# Patient Record
Sex: FEMALE | Race: WHITE | URBAN - METROPOLITAN AREA
[De-identification: names, ages, dates, MRNs, and addresses within clinical notes are randomized per-mention and may not be internally consistent; named-entity substitution may affect disease eponyms.]

---

## 2023-12-04 ENCOUNTER — OFFICE VISIT (OUTPATIENT)
Dept: PRIMARY CARE CLINIC | Age: 53
End: 2023-12-04
Payer: COMMERCIAL

## 2023-12-04 VITALS
HEIGHT: 69 IN | SYSTOLIC BLOOD PRESSURE: 126 MMHG | DIASTOLIC BLOOD PRESSURE: 78 MMHG | OXYGEN SATURATION: 98 % | WEIGHT: 207.6 LBS | HEART RATE: 80 BPM | BODY MASS INDEX: 30.75 KG/M2

## 2023-12-04 DIAGNOSIS — Z13.220 LIPID SCREENING: ICD-10-CM

## 2023-12-04 DIAGNOSIS — Z76.89 ENCOUNTER TO ESTABLISH CARE WITH NEW DOCTOR: Primary | ICD-10-CM

## 2023-12-04 DIAGNOSIS — M53.3 SACROILIAC JOINT PAIN: ICD-10-CM

## 2023-12-04 DIAGNOSIS — F17.210 TOBACCO DEPENDENCE DUE TO CIGARETTES: ICD-10-CM

## 2023-12-04 PROCEDURE — 99204 OFFICE O/P NEW MOD 45 MIN: CPT | Performed by: STUDENT IN AN ORGANIZED HEALTH CARE EDUCATION/TRAINING PROGRAM

## 2023-12-04 RX ORDER — TIZANIDINE 2 MG/1
2 TABLET ORAL EVERY 8 HOURS PRN
Qty: 30 TABLET | Refills: 0 | Status: SHIPPED | OUTPATIENT
Start: 2023-12-04

## 2023-12-04 RX ORDER — IBUPROFEN 600 MG/1
600 TABLET ORAL EVERY 6 HOURS PRN
COMMUNITY

## 2023-12-04 ASSESSMENT — PATIENT HEALTH QUESTIONNAIRE - PHQ9
SUM OF ALL RESPONSES TO PHQ QUESTIONS 1-9: 0
SUM OF ALL RESPONSES TO PHQ9 QUESTIONS 1 & 2: 0
SUM OF ALL RESPONSES TO PHQ QUESTIONS 1-9: 0
1. LITTLE INTEREST OR PLEASURE IN DOING THINGS: 0
2. FEELING DOWN, DEPRESSED OR HOPELESS: 0

## 2023-12-04 ASSESSMENT — ENCOUNTER SYMPTOMS
BLOOD IN STOOL: 0
ABDOMINAL PAIN: 0
DIARRHEA: 0
NAUSEA: 0
SHORTNESS OF BREATH: 0
BACK PAIN: 1
VOMITING: 0

## 2023-12-04 NOTE — PROGRESS NOTES
Standing Status:   Future     Standing Expiration Date:   12/4/2024    CBC with Auto Differential     Standing Status:   Future     Standing Expiration Date:   12/4/2024    Lipid Panel     Standing Status:   Future     Standing Expiration Date:   12/4/2024    Comprehensive Metabolic Panel     Standing Status:   Future     Standing Expiration Date:   12/4/2024    [facility] Physical Therapy [facility]     Referral Priority:   Routine     Referral Type:   Eval and Treat     Referral Reason:   Specialty Services Required     Requested Specialty:   Physical Therapist     Number of Visits Requested:   1     Orders Placed This Encounter   Medications    tiZANidine (ZANAFLEX) 2 MG tablet     Sig: Take 1 tablet by mouth every 8 hours as needed (Low back pain)     Dispense:  30 tablet     Refill:  0       Discussed use, benefit, and side effects of prescribed medications. All patient questions answered. Pt voiced understanding. Reviewed health maintenance. Patient agreed with treatment plan. Follow up as directed.      Electronically signed by Jim Guillaume DO on 12/4/2023 at 5:03 PM

## 2023-12-05 ENCOUNTER — HOSPITAL ENCOUNTER (OUTPATIENT)
Age: 53
Discharge: HOME | End: 2023-12-05
Payer: COMMERCIAL

## 2023-12-05 ENCOUNTER — HOSPITAL ENCOUNTER (OUTPATIENT)
Age: 53
Discharge: HOME | End: 2023-12-07
Payer: COMMERCIAL

## 2023-12-05 ENCOUNTER — HOSPITAL ENCOUNTER (OUTPATIENT)
Dept: GENERAL RADIOLOGY | Age: 53
Discharge: HOME | End: 2023-12-07
Payer: COMMERCIAL

## 2023-12-05 DIAGNOSIS — Z76.89 ENCOUNTER TO ESTABLISH CARE WITH NEW DOCTOR: ICD-10-CM

## 2023-12-05 DIAGNOSIS — Z13.220 LIPID SCREENING: ICD-10-CM

## 2023-12-05 DIAGNOSIS — M53.3 SACROILIAC JOINT PAIN: ICD-10-CM

## 2023-12-05 LAB
ALBUMIN SERPL-MCNC: 4.2 G/DL (ref 3.5–5.2)
ALP SERPL-CCNC: 124 U/L (ref 35–104)
ALT SERPL-CCNC: 10 U/L (ref 5–33)
ANION GAP SERPL CALCULATED.3IONS-SCNC: 10 MMOL/L (ref 9–17)
AST SERPL-CCNC: 13 U/L
BASOPHILS # BLD: 0.1 K/UL (ref 0–0.2)
BASOPHILS NFR BLD: 1 % (ref 0–2)
BILIRUB SERPL-MCNC: 0.4 MG/DL (ref 0.3–1.2)
BUN SERPL-MCNC: 17 MG/DL (ref 6–20)
CALCIUM SERPL-MCNC: 10.1 MG/DL (ref 8.6–10.4)
CHLORIDE SERPL-SCNC: 103 MMOL/L (ref 98–107)
CHOLEST SERPL-MCNC: 175 MG/DL
CHOLESTEROL/HDL RATIO: 4.2
CO2 SERPL-SCNC: 27 MMOL/L (ref 20–31)
CREAT SERPL-MCNC: 0.8 MG/DL (ref 0.5–0.9)
EOSINOPHIL # BLD: 0.2 K/UL (ref 0–0.4)
EOSINOPHILS RELATIVE PERCENT: 3 % (ref 0–4)
ERYTHROCYTE [DISTWIDTH] IN BLOOD BY AUTOMATED COUNT: 14.1 % (ref 11.5–14.9)
GFR SERPL CREATININE-BSD FRML MDRD: >60 ML/MIN/1.73M2
GLUCOSE SERPL-MCNC: 94 MG/DL (ref 70–99)
HCT VFR BLD AUTO: 39.5 % (ref 36–46)
HDLC SERPL-MCNC: 42 MG/DL
HGB BLD-MCNC: 13.2 G/DL (ref 12–16)
LDLC SERPL CALC-MCNC: 114 MG/DL (ref 0–130)
LYMPHOCYTES NFR BLD: 2.8 K/UL (ref 1–4.8)
LYMPHOCYTES RELATIVE PERCENT: 39 % (ref 24–44)
MCH RBC QN AUTO: 33.5 PG (ref 26–34)
MCHC RBC AUTO-ENTMCNC: 33.4 G/DL (ref 31–37)
MCV RBC AUTO: 100.2 FL (ref 80–100)
MONOCYTES NFR BLD: 0.5 K/UL (ref 0.1–1.3)
MONOCYTES NFR BLD: 7 % (ref 1–7)
NEUTROPHILS NFR BLD: 50 % (ref 36–66)
NEUTS SEG NFR BLD: 3.7 K/UL (ref 1.3–9.1)
PLATELET # BLD AUTO: 389 K/UL (ref 150–450)
PMV BLD AUTO: 8.5 FL (ref 6–12)
POTASSIUM SERPL-SCNC: 4.1 MMOL/L (ref 3.7–5.3)
PROT SERPL-MCNC: 8.3 G/DL (ref 6.4–8.3)
RBC # BLD AUTO: 3.94 M/UL (ref 4–5.2)
SODIUM SERPL-SCNC: 140 MMOL/L (ref 135–144)
TRIGL SERPL-MCNC: 97 MG/DL
TSH SERPL DL<=0.05 MIU/L-ACNC: 1.59 UIU/ML (ref 0.3–5)
WBC OTHER # BLD: 7.4 K/UL (ref 3.5–11)

## 2023-12-05 PROCEDURE — 85025 COMPLETE CBC W/AUTO DIFF WBC: CPT

## 2023-12-05 PROCEDURE — 84443 ASSAY THYROID STIM HORMONE: CPT

## 2023-12-05 PROCEDURE — 36415 COLL VENOUS BLD VENIPUNCTURE: CPT

## 2023-12-05 PROCEDURE — 80053 COMPREHEN METABOLIC PANEL: CPT

## 2023-12-05 PROCEDURE — 72202 X-RAY EXAM SI JOINTS 3/> VWS: CPT

## 2023-12-05 PROCEDURE — 80061 LIPID PANEL: CPT

## 2023-12-11 ENCOUNTER — TELEPHONE (OUTPATIENT)
Dept: PRIMARY CARE CLINIC | Age: 53
End: 2023-12-11

## 2023-12-12 NOTE — TELEPHONE ENCOUNTER
Received forms and called patient asking what she is needing FMLA for. Patient said she is unable to work and needs time off. She said she has been off work since 12/4/23 and will return once you clear her to work. Patient advised I would have to get the okay from  before I fill out any forms.     Please advise

## 2023-12-13 ENCOUNTER — HOSPITAL ENCOUNTER (OUTPATIENT)
Dept: PHYSICAL THERAPY | Age: 53
Setting detail: THERAPIES SERIES
Discharge: HOME | End: 2023-12-13
Payer: COMMERCIAL

## 2023-12-13 PROCEDURE — 97530 THERAPEUTIC ACTIVITIES: CPT

## 2023-12-13 PROCEDURE — 97161 PT EVAL LOW COMPLEX 20 MIN: CPT

## 2023-12-13 NOTE — THERAPY EVALUATION
term HEP for continued progress/maintenance after completion of PT      Functional Assessment Used: Mod. HAL  Current Status Score: 19/50 = 38%   Goal Status Score: <15%     Evaluation Complexity:  History (Personal factors, comorbidities) [x] 0 [] 1-2 [] 3+   Exam (limitations, restrictions) [] 1-2 [x] 3 [] 4+   Clinical presentation (progression) [x] Stable [] Evolving  [] Unstable   Decision Making [x] Low [] Moderate [] High    [x] Low Complexity [] Moderate Complexity [] High Complexity     Rehab Potential:  [x] Good  [] Fair  [] Poor   Suggested Professional Referral:  [x] No  [] Yes:  Barriers to Goal Achievement[de-identified]  [x] No  [] Yes:  Domestic Concerns:  [x] No  [] Yes:    Pt. Education:  [x] Plans/Goals, Risks/Benefits discussed  [x] Home exercise program  Method of Education: [x] Verbal  [x] Demo  [x] Written  Comprehension of Education:  [x] Verbalizes understanding. [x] Demonstrates understanding. [] Needs Review. [] Demonstrates/verbalizes understanding of HEP/Ed previously given. Treatment Plan:  [x] Therapeutic Exercise   61711  [] Iontophoresis: 4 mg/mL Dexamethasone Sodium Phosphate  mAmin  43005   [x] Therapeutic Activity  60859 [] Vasopneumatic cold with compression  07345    [] Gait Training   88095 [] Ultrasound   10903   [x] Neuromuscular Re-education  60503 [] Electrical Stimulation Unattended  23740   [x] Manual Therapy  93826 [] Electrical Stimulation Attended  46971   [x] Instruction in HEP  [] Lumbar/Cervical Traction  96584   [x] Aquatic Therapy   01351 [] Cold/hotpack    [] Massage   74994      [] Dry Needling, 1 or 2 muscles  55416   [] Biofeedback, first 15 minutes   72329  [] Biofeedback, additional 15 minutes   44666 [] Dry Needling, 3 or more muscles  57908     []  Medication allergies reviewed for use of    Dexamethasone Sodium Phosphate 4mg/ml     with iontophoresis treatments. Pt is not allergic.        Frequency: 2 x/week for 12 visits    Tip Jackson

## 2023-12-15 ENCOUNTER — HOSPITAL ENCOUNTER (OUTPATIENT)
Dept: PHYSICAL THERAPY | Age: 53
Setting detail: THERAPIES SERIES
Discharge: HOME | End: 2023-12-15
Payer: COMMERCIAL

## 2023-12-15 PROCEDURE — 97113 AQUATIC THERAPY/EXERCISES: CPT

## 2023-12-15 NOTE — FLOWSHEET NOTE
[x] [facility]) - [facility] & Therapy  [address]  [place]: [phone]   F: [phone]    Physical Therapy Daily Treatment Note    Date:  12/15/2023  Patient Name:  Geno Rose    :  1970  MRN: [id number]  Physician:      Eliecer Guillory DO                          Insurance: [facility] Diagnosis: M53.3 (ICD-10-CM) - Sacroiliac joint pain               Rehab Codes: R25 pain , M25.60 stiffness, R53.1 weakness   Onset Date: 2023                       Next 's appt: 24 - PCP   Visit# / total visits:   Cancels/No Shows: 0/0    Subjective:  Pt reports pain in left lumbar back/SI joint area this morning with radicular pain down into left buttocks. States pain usually gets worse with increased activity. Denies any issues after evaluation. Pain:  [x] Yes  [] No Location: Left lumbar back along SI joint   Pain Rating: (0-10 scale)  4/10  Pain altered Tx:  [x] No  [] Yes  Action:  Comments: Initial aquatic therapy visit. Educated on postural awareness, core stability and working in pain free ranges with all exercises performed. Objective:    [facility] [employer] Exercise Log  Aquatic, Hip & DLS Program- Phase 1    Date of Eval:   23                             Primary PT: Irasema Rojas, PT      Date 12/15/23       Visit # 2/12       Walk F/L/R 2 Laps       Marching 10x       Squats 10x3\"       Step-Ups F/L        Step Down F/L        Heel-toe raises 10x       SLR F/L/R 10x       Hip/Knee Flex/Ext        F/L Lunges                Kickboard Ex.  Small       Iso Abd. 10x5\"       Push-pull 10x       Paddling                UE Format:        Horiz Abd/Add        IR/ER (wipers)        Alt Flex/Ext        Alt Press Down        Abd/Add                Deep Water: 1 Noodle       Hang 3'       Cycling        [place]        X-Country                Balance        SLS                Stretches        Achllies

## 2023-12-27 ENCOUNTER — HOSPITAL ENCOUNTER (OUTPATIENT)
Dept: PHYSICAL THERAPY | Age: 53
Setting detail: THERAPIES SERIES
Discharge: HOME | End: 2023-12-27
Payer: COMMERCIAL

## 2023-12-27 PROCEDURE — 97113 AQUATIC THERAPY/EXERCISES: CPT

## 2023-12-27 NOTE — FLOWSHEET NOTE
[x] [address] [facility] & Therapy  [address]  [place]: [phone]   F: [phone]    Physical Therapy Daily Treatment Note    Date:  2023  Patient Name:  Roque Ramirez    :  1970  MRN: [id number]  Physician:      Bill Morgan DO                          Insurance: [employer] P[employer] blue   Medical Diagnosis: M53.3 (ICD-10-CM) - Sacroiliac joint pain               Rehab Codes: R25 pain , M25.60 stiffness, R53.1 weakness   Onset Date: 2023                       Next 's appt: 24 - PCP   Visit# / total visits:   Cancels/No Shows: 0/0    Subjective:  Pt reports no issues after last visit. States getting out of bed in the morning is getting easier. Also notes some ADLs are easier while other still cause increased pain. Notes pain still gets very high when she over-does-it. Pain:  [x] Yes  [] No Location: Left lumbar back along SI joint   Pain Rating: (0-10 scale)  2-3/10  Pain altered Tx:  [x] No  [] Yes  Action:  Comments: reviewed postural awareness, core stability and working in pain free ranges with all exercises performed. Objective:    [address] [place] Exercise Log  Aquatic, Hip & DLS Program- Phase 1    Date of Eval:   23                             Primary PT: Jossie Sebastian, PT      Date 12/15/23 12/18/23 12/20/23 12/27/23    Visit # 2/12 3/12 4/12 5/12    Walk F/L/R 2 Laps 2 Laps +R 2 Laps 2 Laps    Marching 10x 10x 10x 2 Laps     Squats 10x3\" 10x3\" 10x3\" 10x3\"    Step-Ups F/L   Low 10x Low 10x    Step Down F/L        Heel-toe raises 10x 10x 12x 12x    SLR F/L/R 10x 10x 10x 12x    Hip/Knee Flex/Ext  10x 10x 12x    F/L Lunges                Kickboard Ex.  Small Small Med Med    Iso Abd. 10x5\" 10x5\" 10x5' 10x5\"    Push-pull 10x 10x 12x 12x    Paddling    12x            UE Format:        Horiz Abd/Add     Add   IR/ER (wipers)        Alt Flex/Ext     Add   Alt Press Down        Abd/Add     Add

## 2023-12-29 ENCOUNTER — HOSPITAL ENCOUNTER (OUTPATIENT)
Dept: PHYSICAL THERAPY | Age: 53
Setting detail: THERAPIES SERIES
Discharge: HOME | End: 2023-12-29
Payer: COMMERCIAL

## 2023-12-29 PROCEDURE — 97113 AQUATIC THERAPY/EXERCISES: CPT

## 2023-12-29 NOTE — FLOWSHEET NOTE
[x] [facility] - [facility] & Therapy  [address]  [place]: [phone]   F: [phone]    Physical Therapy Daily Treatment Note    Date:  2023  Patient Name:  Ginny Gomes    :  1970  MRN: [id number]  Physician:      Olga Chinchilla DO                          Insurance: [employer]  PPO blue   Medical Diagnosis: M53.3 (ICD-10-CM) - Sacroiliac joint pain               Rehab Codes: R25 pain , M25.60 stiffness, R53.1 weakness   Onset Date: 2023                       Next 's appt: 24 - PCP   Visit# / total visits:   Cancels/No Shows: 0/0    Subjective:  Pt reports mild soreness after last visit but no increased pain. Notes pain has been better but still will increase with certain motions/positions. For prolonged periods of time. Pain:  [x] Yes  [] No Location: Left lumbar back along SI joint   Pain Rating: (0-10 scale)  1/10  Pain altered Tx:  [x] No  [] Yes  Action:  Comments: reviewed postural awareness, core stability and working in pain free ranges with all exercises performed. Objective:    [address] [place] Exercise Log  Aquatic, Hip & DLS Program- Phase 1    Date of Eval:   23                             Primary PT: Pavel Gaming, PT      Date 12/15/23 12/18/23 12/20/23 12/27/23 12/29/23   Visit # 2/12 3/12 4/12 5/12 6/12   Walk F/L/R 2 Laps 2 Laps +R 2 Laps 2 Laps 2 Laps   Marching 10x 10x 10x 2 Laps  2 Laps   Squats 10x3\" 10x3\" 10x3\" 10x3\" 12x5\"   Step-Ups F/L   Low 10x Low 10x Low 12x   Step Down F/L        Heel-toe raises 10x 10x 12x 12x 12x   SLR F/L/R 10x 10x 10x 12x 12x   Hip/Knee Flex/Ext  10x 10x 12x 12x   F/L Lunges                Kickboard Ex.  Small Small Med Med Lg   Iso Abd. 10x5\" 10x5\" 10x5' 10x5\" 12x5\"   Push-pull 10x 10x 12x 12x 12x   Paddling    12x 12x           UE Format:        Horiz Abd/Add     10x   IR/ER (wipers)        Alt Flex/Ext     10x   Alt Press Down        Abd/Add

## 2024-01-04 ENCOUNTER — HOSPITAL ENCOUNTER (OUTPATIENT)
Dept: PHYSICAL THERAPY | Age: 54
Setting detail: THERAPIES SERIES
Discharge: HOME | End: 2024-01-04
Payer: COMMERCIAL

## 2024-01-04 PROCEDURE — 97113 AQUATIC THERAPY/EXERCISES: CPT

## 2024-01-04 NOTE — FLOWSHEET NOTE
[x] [facility]   Outpatient Rehabilitation & Therapy  [address]  P: [phone]   F: [phone]    Physical Therapy Daily Treatment Note    Date:  2024  Patient Name:  Yoly Peck    :  1970  MRN: [id number]  Physician:      Anthony Pascal DO                          Insurance: [employer]   Medical Diagnosis: M53.3 (ICD-10-CM) - Sacroiliac joint pain               Rehab Codes: R25 pain , M25.60 stiffness, R53.1 weakness   Onset Date: 2023                       Next 's appt: 24 - PCP   Visit# / total visits:   Cancels/No Shows: 0/0    Subjective: Patient reporting she is able to get out of bed with less pain.  Patient reporting overall less pain with mobility.     Pain:  [x] Yes  [] No Location: Left lumbar back along SI joint   Pain Rating: (0-10 scale)  3/10  Pain altered Tx:  [x] No  [] Yes  Action:  Comments: reviewed postural awareness, core stability and working in pain free ranges with all exercises performed.     Objective:    [facility] Exercise Log  Aquatic, Hip & DLS Program- Phase 1    Date of Eval:   23                             Primary PT: Hernandez Mckinley, PT      Date 23   Visit #    Walk F/L/R 2 Laps 2 Laps 2 Laps   Marching 2 Laps  2 Laps 2 Laps   Squats 10x3\" 12x5\" 12x 5\"   Step-Ups F/L Low 10x Low 12x Low 12x   Step Down F/L      Heel-toe raises 12x 12x Low 12x   SLR F/L/R 12x 12x Low 12x   Hip/Knee Flex/Ext 12x 12x Low 12x   F/L Lunges            Kickboard Ex. Med Lg Lg   Iso Abd. 10x5\" 12x5\" 12x5\"   Push-pull 12x 12x 12x5\"   Paddling 12x 12x 12x         UE Format:   Water chest height   Horiz Abd/Add  10x 10x   IR/ER (wipers)   10x   Alt Flex/Ext  10x 10x   Alt Press Down   10x   Abd/Add  10x 10x         Deep Water: 1 Noodle 1 Noodle 1 Noodle   Hang 5' 5' Pt declined   Cycling 1' 2' 2'   Jacks 1' 2' 2'   X-Country 1' 2' 2'         Balance      SLS

## 2024-01-05 ENCOUNTER — HOSPITAL ENCOUNTER (OUTPATIENT)
Dept: PHYSICAL THERAPY | Age: 54
Setting detail: THERAPIES SERIES
Discharge: HOME | End: 2024-01-05
Payer: COMMERCIAL

## 2024-01-05 PROCEDURE — 97113 AQUATIC THERAPY/EXERCISES: CPT

## 2024-01-05 NOTE — FLOWSHEET NOTE
[x] [facility]   Outpatient Rehabilitation & Therapy  [address]  P: [phone]   F: [phone]    Physical Therapy Daily Treatment Note    Date:  2024  Patient Name:  Yoly Peck    :  1970  MRN: [id number]  Physician:      Anthony Pascal DO                          Insurance: [employer]BS [employer] [facility]   Medical Diagnosis: M53.3 (ICD-10-CM) - Sacroiliac joint pain               Rehab Codes: R25 pain , M25.60 stiffness, R53.1 weakness   Onset Date: 2023                       Next 's appt: 24 - PCP   Visit# / total visits:   Cancels/No Shows: 0/0    Subjective: Patient reporting she was sore after yesterday's session stating she would like to do the deep water hang to see if that helps.    Pain:  [x] Yes  [] No Location: Left lumbar back along SI joint   Pain Rating: (0-10 scale)  3/10  Pain altered Tx:  [x] No  [] Yes  Action:  Comments:    Objective:    [facility] Exercise Log  Aquatic, Hip & DLS Program- Phase 1    Date of Eval:   23                             Primary PT: Hernandez Mckinley, PT      Date 23   Visit #    Walk F/L/R 2 Laps 2 Laps 2 Laps 2 Laps   Marching 2 Laps  2 Laps 2 Laps 2   Squats 10x3\" 12x5\" 12x 5\" 12x5\"   Step-Ups F/L Low 10x Low 12x Low 12x Tall 12x    Step Down F/L       Heel-toe raises 12x 12x Low 12x Low 12x   SLR F/L/R 12x 12x Low 12x Low 12x   Hip/Knee Flex/Ext 12x 12x Low 12x Low 12x   F/L Lunges              Kickboard Ex. Med Lg Lg Lg   Iso Abd. 10x5\" 12x5\" 12x5\" 12x5\"   Push-pull 12x 12x 12x 12x   Paddling 12x 12x 12x 12x          UE Format:   Water chest height Water chest height   Horiz Abd/Add  10x 10x 10x   IR/ER (wipers)   10x 10x   Alt Flex/Ext  10x 10x 10x   Alt Press Down   10x 10x   Abd/Add  10x 10x 10x          Deep Water: 1 Noodle 1 Noodle 1 Noodle 1 noodle   Hang 5' 5' Pt declined 5'   Cycling 1' 2' 2' 2'   Jacks 1' 2' 2' 2'

## 2024-01-09 ENCOUNTER — HOSPITAL ENCOUNTER (OUTPATIENT)
Dept: PHYSICAL THERAPY | Age: 54
Setting detail: THERAPIES SERIES
Discharge: HOME | End: 2024-01-09
Payer: COMMERCIAL

## 2024-01-09 PROCEDURE — 97113 AQUATIC THERAPY/EXERCISES: CPT

## 2024-01-09 NOTE — FLOWSHEET NOTE
[x] [facility]   Outpatient Rehabilitation & Therapy  [address]  P: [phone]   F: [phone]    Physical Therapy Daily Treatment Note    Date:  2024  Patient Name:  Yoly Peck    :  1970  MRN: [id number]  Physician:      Anthony Pascal DO                          Insurance: [employer]   Medical Diagnosis: M53.3 (ICD-10-CM) - Sacroiliac joint pain               Rehab Codes: R25 pain , M25.60 stiffness, R53.1 weakness   Onset Date: 2023                       Next 's appt: 24 - PCP   Visit# / total visits:   Cancels/No Shows: 0/0    Subjective: Patient reports pain still in left SI joint area.  Notes morning are improved and able to get out of bed with less pain.  Also noting less high pain days but does still have them.  Notes less pain with ADLs and able to be more active without increasing pain since starting therapy.     Pain:  [x] Yes  [] No Location: Left lumbar back along SI joint   Pain Rating: (0-10 scale)  3/10  Pain altered Tx:  [x] No  [] Yes  Action:  Comments:    Objective:    [facility] Exercise Log  Aquatic, Hip & DLS Program- Phase 1    Date of Eval:   23                             Primary PT: Hernandez Mckinley, PT      Date 23   Visit #    Walk F/L/R 2 Laps 2 Laps 2 Laps 2 Laps 2 Laps   Marching 2 Laps  2 Laps 2 Laps 2 2 Laps         Low box for all LE exs   Squats 10x3\" 12x5\" 12x 5\" 12x5\" 12x5\"   Step-Ups F/L Low 10x Low 12x Low 12x Tall 12x  Tall 12x   Step Down F/L        Heel-toe raises 12x 12x Low 12x Low 12x 15x   SLR F/L/R 12x 12x Low 12x Low 12x 15x   Hip/Knee Flex/Ext 12x 12x Low 12x Low 12x 15x   F/L Lunges                Kickboard Ex. Med Lg Lg Lg Lg   Iso Abd. 10x5\" 12x5\" 12x5\" 12x5\" 12x5\"   Push-pull 12x 12x 12x 12x 15x   Paddling 12x 12x 12x 12x 15x           UE Format:   Water chest height Water chest height

## 2024-01-11 ENCOUNTER — TELEPHONE (OUTPATIENT)
Dept: PRIMARY CARE CLINIC | Age: 54
End: 2024-01-11

## 2024-01-11 ENCOUNTER — HOSPITAL ENCOUNTER (OUTPATIENT)
Dept: PHYSICAL THERAPY | Age: 54
Setting detail: THERAPIES SERIES
Discharge: HOME | End: 2024-01-11
Payer: COMMERCIAL

## 2024-01-11 PROCEDURE — 97113 AQUATIC THERAPY/EXERCISES: CPT

## 2024-01-11 NOTE — FLOWSHEET NOTE
[x] [facility]   Outpatient Rehabilitation & Therapy  [address]  P: [phone]   F: [phone]    Physical Therapy Daily Treatment Note    Date:  2024  Patient Name:  Yoly Peck    :  1970  MRN: [id number]  Physician:      Anthony Pascal DO                          Insurance: [employer]   Medical Diagnosis: M53.3 (ICD-10-CM) - Sacroiliac joint pain               Rehab Codes: R25 pain , M25.60 stiffness, R53.1 weakness   Onset Date: 2023                       Next 's appt: 24 - PCP   Visit# / total visits: 10/12  Cancels/No Shows: 0/0    Subjective: Patient reports increased soreness for the rest of the day after therapy but manageable.  Increased pain yesterday due to sitting in the car for a few hours.  Reports pain usually increases after about 15 minutes of sitting.      Pain:  [x] Yes  [] No Location: Left lumbar back along SI joint   Pain Rating: (0-10 scale)  1/10  Pain altered Tx:  [x] No  [] Yes  Action:  Comments:    Objective:    [facility] Exercise Log  Aquatic, Hip & DLS Program- Phase 1    Date of Eval:   23                             Primary PT: Hernandez Mckinley, PT      Date 24   Visit # 7/12 8/12 9/12 10/12   Walk F/L/R 2 Laps 2 Laps 2 Laps 2 Laps   Marching 2 Laps 2 2 Laps  2 Laps      Low box for all LE exs Low box for all LE exs   Squats 12x 5\" 12x5\" 12x5\" 15x5\"   Step-Ups F/L Low 12x Tall 12x  Tall 12x Tall 15x   Step Down F/L       Heel-toe raises Low 12x Low 12x 15x 15x   SLR F/L/R Low 12x Low 12x 15x 15x   Hip/Knee Flex/Ext Low 12x Low 12x 15x 15x   F/L Lunges              Kickboard Ex. Lg Lg Lg Lg   Iso Abd. 12x5\" 12x5\" 12x5\" 15x5\"   Push-pull 12x 12x 15x 15x   Paddling 12x 12x 15x 15x          UE Format: Water chest height Water chest height Chest deep Chest Deep   Horiz Abd/Add 10x 10x 12x 15x   IR/ER (wipers) 10x 10x 12x 15x   Alt Flex/Ext 10x 10x 12x

## 2024-01-11 NOTE — TELEPHONE ENCOUNTER
----- Message from Yari Jose Luis sent at 1/9/2024 10:26 AM EST -----  Subject: Message to Provider    QUESTIONS  Information for Provider? Patient is calling to see if she FMLA paperwork   got sent to [place]. Please call   ---------------------------------------------------------------------------  --------------  CALL BACK INFO  [phone]; OK to leave message on voicemail  ---------------------------------------------------------------------------  --------------  SCRIPT ANSWERS  Relationship to Patient? Self

## 2024-01-16 ENCOUNTER — HOSPITAL ENCOUNTER (OUTPATIENT)
Dept: PHYSICAL THERAPY | Age: 54
Setting detail: THERAPIES SERIES
Discharge: HOME | End: 2024-01-16
Payer: COMMERCIAL

## 2024-01-16 PROCEDURE — 97530 THERAPEUTIC ACTIVITIES: CPT

## 2024-01-16 PROCEDURE — 97113 AQUATIC THERAPY/EXERCISES: CPT

## 2024-01-16 NOTE — PROGRESS NOTES
with LE movement showing improved lower trunk control to stabilize spine.    1/16: MET   Pt will have a neutral pelvic alignment on standing showing improved alignment and decreased muscle tightness allowing for better movement tolerances.    1/16: MET   LTG: (to be met in 12 treatments)  Pt will demonstrate awareness of lumbar protection strategies with bed mobility, bending/reaching, and lifting to reduce strain to spine and complete ADLs with more stability, reducing dysfunction.  1/16: MET      Pt will be able to push/pull > 50# without > 2 pt increase in back pain to demonstrate improved tolerance to work activities.    1/16: progressing   Pt will decrease functional limitation per mod HAL to <15% in order to demonstrate improved functional tolerances at PLOF with minimal restriction/dysfunction   1/16: MET - 14%   Pt will demonstrate independence with a long term HEP for continued progress/maintenance after completion of PT    1/16: progressing -- will continue to update with remaining visits.   5. New gaol 1/16/24: Pt will be able to lift > 50# from the floor and from a chair to be able to complete work duties with less dysfunction.     Treatment Plan:  [x] Therapeutic Exercise   72441  [] Iontophoresis: 4 mg/mL Dexamethasone Sodium Phosphate  mAmin  06028   [x] Therapeutic Activity  92177 [] Vasopneumatic cold with compression  87808    [] Gait Training   79117 [] Ultrasound   37349   [x] Neuromuscular Re-education  63558 [] Electrical Stimulation Unattended  00978   [x] Manual Therapy  68777 [] Electrical Stimulation Attended  91302   [x] Instruction in HEP  [] Lumbar/Cervical Traction  04469   [] Aquatic Therapy   70196 [] Cold/hotpack    [] Massage   50392      [] Dry Needling, 1 or 2 muscles  20560   [] Biofeedback, first 15 minutes   90912  [] Biofeedback, additional 15 minutes   90913 [] Dry Needling, 3 or more muscles  20561      Patient Status:     [] Continue per initial plan of care.    [x]

## 2024-01-16 NOTE — FLOWSHEET NOTE
Water chest height Chest deep Chest Deep Chest Deep   Horiz Abd/Add 10x 10x 12x 15x 15x   IR/ER (wipers) 10x 10x 12x 15x 15x   Alt Flex/Ext 10x 10x 12x 15x 15x   Alt Press Down 10x 10x 12x 15x 15x   Abd/Add 10x 10x 12x 15x 15x           Deep Water: 1 Noodle 1 noodle 1 Noodle 1 Noodle 1 Noodle   Hang Pt declined 5' 5' 5' 5'   Cycling 2' 2' 2' 2' 2'   Jacks 2' 2' 2' 2' 2'   X-Country 2' 2' 2' 2' 2'           Balance        SLS                Stretches        Achllies        Hamstring 2x20\" 2x20\" 2x20\" 2x20\" 2x20\"           Cool Down   2 Laps Breast Stroke 2 Laps Breast Stroke 2 Laps Breast Stroke   Pain Rating 2 depends on movement 1-2 (pt reporting pulling with certain motions) 1-2 2 2      Specific Instructions for next treatment: Add heel taps and progress UE format to Time/count to encourage increased speed for further scapular and core stability.    Assessment: [x] Progressing toward goals.  Good tolerance to increased speed and encouraged patient to use little to no UE support to further challenge trunk/core stability.  Minimal difficulty with balance and stability but more challenging per patient.  Denies any increased pain but still notes pulling and tightness in left SI joint area with lateral step ups to tall step. Ended with deep water exercises and hang for core and LE endurance and pain relief by unloading spine.      [] No change.     [] Other:    [x] Patient would continue to benefit from skilled physical therapy services in order to:  improve lumbar mobility, increase posterior chain flexibility, improve proximal strength, and work on reloading the spine in a safe manner to allow her complete ADLs and work duties with less dysfunction.     STG: (to be met in 6 treatments)  Pt will self report worst pain no greater than 3/10 low back pain upon arrival to sessions in order to better tolerate ADLs/work activities with minimal dysfunction  Pt will improve AROM in all lumbar planes to >75% with no pain

## 2024-01-18 ENCOUNTER — HOSPITAL ENCOUNTER (OUTPATIENT)
Dept: PHYSICAL THERAPY | Age: 54
Setting detail: THERAPIES SERIES
Discharge: HOME | End: 2024-01-18
Payer: COMMERCIAL

## 2024-01-18 ENCOUNTER — APPOINTMENT (OUTPATIENT)
Dept: PHYSICAL THERAPY | Age: 54
End: 2024-01-18
Payer: COMMERCIAL

## 2024-01-18 PROCEDURE — 97110 THERAPEUTIC EXERCISES: CPT

## 2024-01-18 NOTE — FLOWSHEET NOTE
[x] [facility]   Outpatient Rehabilitation & Therapy  [address]  P: [phone]   F: [phone]    Physical Therapy Daily Treatment Note      Date:  2024  Patient Name:  Yoly Peck    :  1970  MRN: [id number]  Physician:      Anthony Pascal DO                          Insurance: [employer]BS [employer] [facility]   Medical Diagnosis: M53.3 (ICD-10-CM) - Sacroiliac joint pain               Rehab Codes: R25 pain , M25.60 stiffness, R53.1 weakness   Onset Date: 2023                       Next 's appt: 24 - PCP   Visit# / total visits:                   Cancels/No Shows: 0/0    Subjective:  Patient reporting her pain is dependent on level of activity.  Patient reporting the push/pull activity from PN caused increased pain.      Pain:  [x] Yes  [] No Location: low back Pain Rating: (0-10 scale) 1/10  Pain altered Tx:  [] No  [] Yes  Action:  Comments:    Objective:  Modalities:   Precautions:  Exercises:  Exercise Reps/ Time Weight/ Level Completed  Today Comments   NuStep   x Added as warm up while gathering subjective data to improve mobility at SI          Mat       Quad stretch 3x30\"  x    Hamstring stretch 3x30\"  x    LTR 10x 5\"  x    bridging 10x 3\"  x           Standing       3 way hip 10x ea  x    Mini squats  10x  x    Step ups fwd/lat 10x  x           Other:    Specific Instructions for next treatment:      Assessment: [] Progressing toward goals.    [] No change.     [x] Other: Initial session post eval-  Began session on NuStep as warm up while gathering subjective info.  Added NuStep to simulate weight bearing into BLEs with reciprocal LE movement to prep SI region for exercise.  Added mat level stretching this date to improve lumbar and BLE mobility- pt reported cramping in quads post NuStep.  R-educated the patient on the importance of abdominal bracing to improve core and trunk stability.  Added LTR and bridging with emphasis on core engagement and to

## 2024-01-23 ENCOUNTER — HOSPITAL ENCOUNTER (OUTPATIENT)
Dept: PHYSICAL THERAPY | Age: 54
Setting detail: THERAPIES SERIES
Discharge: HOME | End: 2024-01-23
Payer: COMMERCIAL

## 2024-01-23 PROCEDURE — 97110 THERAPEUTIC EXERCISES: CPT

## 2024-01-23 NOTE — FLOWSHEET NOTE
Pt will decrease functional limitation per mod HAL to <15% in order to demonstrate improved functional tolerances at PLOF with minimal restriction/dysfunction        1/16: MET - 14%   Pt will demonstrate independence with a long term HEP for continued progress/maintenance after completion of PT         1/16: progressing -- will continue to update with remaining visits.   5. New gaol 1/16/24: Pt will be able to lift > 50# from the floor and from a chair to be able to complete work duties with less dysfunction.     Pt. Education:  [x] Yes  [] No  [] Reviewed Prior HEP/Ed  Method of Education: [x] Verbal  [x] Demo  [x] Written  Access Code: [email or web address]  URL: [email or web address]  Date: 01/18/2024  Prepared by: Carole Lozoya    Exercises  - Supine Lower Trunk Rotation  - 1 x daily - 7 x weekly - 3 sets - 10 reps  - Supine Bridge  - 1 x daily - 7 x weekly - 3 sets - 10 reps  - Supine Hamstring Stretch with Strap  - 1 x daily - 7 x weekly - 3-5 reps - 30sec hold  - Supine Quadriceps Stretch with Strap on Table  - 1 x daily - 7 x weekly - 3-5 reps - 30 sec hold  Comprehension of Education:  [x] Verbalizes understanding.  [x] Demonstrates understanding.  [] Needs review.  [] Demonstrates/verbalizes HEP/Ed previously given.     Plan: [x] Continue per plan of care.   [] Other:      Treatment Charges: Mins Units   []  Modalities     [x]  Ther Exercise 38 3   []  Manual Therapy     []  Ther Activities     []  Aquatics     []  Neuromuscular     [] Vasocompression     [] Gait Training     [] Dry needling        [] 1 or 2 muscles        [] 3 or more muscles     []  Other     Total Billable time 38 3     Time In: 0948           Time Out: 1026    Electronically signed by:  Hernandez Mckinley PT

## 2024-01-25 ENCOUNTER — HOSPITAL ENCOUNTER (OUTPATIENT)
Dept: PHYSICAL THERAPY | Age: 54
Setting detail: THERAPIES SERIES
Discharge: HOME | End: 2024-01-25
Payer: COMMERCIAL

## 2024-01-25 PROCEDURE — 97110 THERAPEUTIC EXERCISES: CPT

## 2024-01-25 NOTE — FLOWSHEET NOTE
standing showing improved alignment and decreased muscle tightness allowing for better movement tolerances.         1/16: MET   LTG: (to be met in 12 treatments)  Pt will demonstrate awareness of lumbar protection strategies with bed mobility, bending/reaching, and lifting to reduce strain to spine and complete ADLs with more stability, reducing dysfunction.  1/16: MET      Pt will be able to push/pull > 50# without > 2 pt increase in back pain to demonstrate improved tolerance to work activities.               1/16: progressing   Pt will decrease functional limitation per mod HAL to <15% in order to demonstrate improved functional tolerances at PLOF with minimal restriction/dysfunction        1/16: MET - 14%   Pt will demonstrate independence with a long term HEP for continued progress/maintenance after completion of PT         1/16: progressing -- will continue to update with remaining visits.   5. New gaol 1/16/24: Pt will be able to lift > 50# from the floor and from a chair to be able to complete work duties with less dysfunction.     Pt. Education:  [x] Yes  [] No  [] Reviewed Prior HEP/Ed  Method of Education: [x] Verbal  [x] Demo  [x] Written  Access Code: [email or web address]  URL: [email or web address]  Date: 01/18/2024  Prepared by: Carole Lozoya    Exercises  - Supine Lower Trunk Rotation  - 1 x daily - 7 x weekly - 3 sets - 10 reps  - Supine Bridge  - 1 x daily - 7 x weekly - 3 sets - 10 reps  - Supine Hamstring Stretch with Strap  - 1 x daily - 7 x weekly - 3-5 reps - 30sec hold  - Supine Quadriceps Stretch with Strap on Table  - 1 x daily - 7 x weekly - 3-5 reps - 30 sec hold  Comprehension of Education:  [x] Verbalizes understanding.  [x] Demonstrates understanding.  [] Needs review.  [] Demonstrates/verbalizes HEP/Ed previously given.     Plan: [x] Continue per plan of care.   [] Other:      Treatment Charges: Mins Units   []  Modalities     [x]  Ther Exercise 41 3   []  Manual Therapy     []  Ther

## 2024-01-30 ENCOUNTER — HOSPITAL ENCOUNTER (OUTPATIENT)
Dept: PHYSICAL THERAPY | Age: 54
Setting detail: THERAPIES SERIES
Discharge: HOME | End: 2024-01-30
Payer: COMMERCIAL

## 2024-01-30 PROCEDURE — 97140 MANUAL THERAPY 1/> REGIONS: CPT

## 2024-01-30 PROCEDURE — 97110 THERAPEUTIC EXERCISES: CPT

## 2024-01-30 NOTE — FLOWSHEET NOTE
Continue per plan of care.   [] Other:      Treatment Charges: Mins Units   []  Modalities     [x]  Ther Exercise 34 2   [x]  Manual Therapy 8 1   []  Ther Activities     []  Aquatics     []  Neuromuscular     [] Vasocompression     [] Gait Training     [] Dry needling        [] 1 or 2 muscles        [] 3 or more muscles     []  Other     Total Billable time 42 3     Time In:  0945         Time Out: 1027    Electronically signed by:  Hernandez Mckinley PT

## 2024-02-01 ENCOUNTER — HOSPITAL ENCOUNTER (OUTPATIENT)
Dept: PHYSICAL THERAPY | Age: 54
Setting detail: THERAPIES SERIES
Discharge: HOME | End: 2024-02-01
Payer: COMMERCIAL

## 2024-02-01 PROCEDURE — 97110 THERAPEUTIC EXERCISES: CPT

## 2024-02-01 PROCEDURE — 97140 MANUAL THERAPY 1/> REGIONS: CPT

## 2024-02-01 NOTE — FLOWSHEET NOTE
flexor stretch on step  3x20s ea       RDLs 10x2 15# KB   Delayed hip extension activation    KB front raise  10x2 15# KB      Palloff press  10x2 blue     Palloff walk out  5x ea Blue   More difficulty with going to R to keep trunk control    Forward & lateral lunges 10x ea       Unilateral carry  2 laps 15# KB   1 lap with each hand, cues for trunk alignment as tends to lean    Other:  2/1 Educated the patient on seated lumbar roll for inc low back support when sitting.  Pt reporting stretching sensation.      PAIN POST SESSION: 4/10     Specific Instructions for next treatment:  - progress loading and standing tolerances       Assessment: [x] Progressing toward goals. 2/1  Added PA mobs this session to SI to pt's tolerance with pressure sensitivity noted this date.  Continued with mat level stretching and strengthening with occasional cues provided to encourage core and glut activation.  Followed with standing exercises with emphasis on core engagement and improve hip stability.  Discomfort noted in pt's low back with SLS and use, especially with squats.  Educated the patient on lumbar roll to increase lumbar support when seated with the patient reporting a stretching sensation in her low back with no increase in pain.   Patient reporting 4/10 pain at the end of session.      [] No change.     [] Other:     [x] Patient would continue to benefit from skilled physical therapy services in order to: improve lumbar mobility, increase posterior chain flexibility, improve proximal strength, and work on reloading the spine in a safe manner to allow her complete ADLs and work duties with less dysfunction.     Goals: Assessed/updated 1/16/24  STG: (to be met in 6 treatments)  Pt will self report worst pain no greater than 3/10 low back pain upon arrival to sessions in order to better tolerate ADLs/work activities with minimal dysfunction              1/16: MET - 3/10 at highest   Pt will improve AROM in all lumbar planes

## 2024-02-05 ENCOUNTER — OFFICE VISIT (OUTPATIENT)
Dept: PRIMARY CARE CLINIC | Age: 54
End: 2024-02-05
Payer: COMMERCIAL

## 2024-02-05 VITALS
HEART RATE: 78 BPM | WEIGHT: 210 LBS | BODY MASS INDEX: 31.01 KG/M2 | DIASTOLIC BLOOD PRESSURE: 74 MMHG | SYSTOLIC BLOOD PRESSURE: 128 MMHG | OXYGEN SATURATION: 100 %

## 2024-02-05 DIAGNOSIS — Z12.31 ENCOUNTER FOR SCREENING MAMMOGRAM FOR MALIGNANT NEOPLASM OF BREAST: ICD-10-CM

## 2024-02-05 DIAGNOSIS — F17.210 TOBACCO DEPENDENCE DUE TO CIGARETTES: ICD-10-CM

## 2024-02-05 DIAGNOSIS — D53.9 MACROCYTIC ANEMIA: ICD-10-CM

## 2024-02-05 DIAGNOSIS — M53.3 SACROILIAC JOINT PAIN: Primary | ICD-10-CM

## 2024-02-05 DIAGNOSIS — Z12.11 COLON CANCER SCREENING: ICD-10-CM

## 2024-02-05 PROCEDURE — 99214 OFFICE O/P EST MOD 30 MIN: CPT | Performed by: STUDENT IN AN ORGANIZED HEALTH CARE EDUCATION/TRAINING PROGRAM

## 2024-02-05 RX ORDER — ACETAMINOPHEN 325 MG/1
325 TABLET ORAL EVERY 6 HOURS PRN
COMMUNITY
Start: 1900-01-01

## 2024-02-05 RX ORDER — VARENICLINE TARTRATE 0.5 MG/1
.5-1 TABLET, FILM COATED ORAL SEE ADMIN INSTRUCTIONS
Qty: 57 TABLET | Refills: 0 | Status: SHIPPED | OUTPATIENT
Start: 2024-02-05

## 2024-02-05 SDOH — ECONOMIC STABILITY: FOOD INSECURITY: WITHIN THE PAST 12 MONTHS, YOU WORRIED THAT YOUR FOOD WOULD RUN OUT BEFORE YOU GOT MONEY TO BUY MORE.: NEVER TRUE

## 2024-02-05 SDOH — ECONOMIC STABILITY: HOUSING INSECURITY
IN THE LAST 12 MONTHS, WAS THERE A TIME WHEN YOU DID NOT HAVE A STEADY PLACE TO SLEEP OR SLEPT IN A SHELTER (INCLUDING NOW)?: NO

## 2024-02-05 SDOH — ECONOMIC STABILITY: INCOME INSECURITY: HOW HARD IS IT FOR YOU TO PAY FOR THE VERY BASICS LIKE FOOD, HOUSING, MEDICAL CARE, AND HEATING?: NOT HARD AT ALL

## 2024-02-05 SDOH — ECONOMIC STABILITY: FOOD INSECURITY: WITHIN THE PAST 12 MONTHS, THE FOOD YOU BOUGHT JUST DIDN'T LAST AND YOU DIDN'T HAVE MONEY TO GET MORE.: NEVER TRUE

## 2024-02-05 ASSESSMENT — PATIENT HEALTH QUESTIONNAIRE - PHQ9
SUM OF ALL RESPONSES TO PHQ QUESTIONS 1-9: 0
2. FEELING DOWN, DEPRESSED OR HOPELESS: 0
1. LITTLE INTEREST OR PLEASURE IN DOING THINGS: 0
SUM OF ALL RESPONSES TO PHQ9 QUESTIONS 1 & 2: 0

## 2024-02-05 ASSESSMENT — ENCOUNTER SYMPTOMS
VOMITING: 0
SHORTNESS OF BREATH: 0
ABDOMINAL PAIN: 0
NAUSEA: 0

## 2024-02-05 NOTE — PROGRESS NOTES
Refill:  0           Discussed risks and benefits of above plan. All patient questions were answered prior to patient leaving the office, today. Reviewed health maintenance. Instructed regarding current medications, diet, and exercise. Patient agreed with treatment plan.    Electronically signed by Anthony Pascal DO on 2/5/2024 at 10:26 AM

## 2024-02-12 ENCOUNTER — HOSPITAL ENCOUNTER (OUTPATIENT)
Dept: PHYSICAL THERAPY | Age: 54
Setting detail: THERAPIES SERIES
Discharge: HOME | End: 2024-02-12
Payer: COMMERCIAL

## 2024-02-12 PROCEDURE — 97110 THERAPEUTIC EXERCISES: CPT

## 2024-02-12 NOTE — THERAPY DISCHARGE
[x] [facility]   Outpatient Rehabilitation & Therapy  [address]  P: [phone]   F: [phone]    Physical Therapy Daily Treatment Note/Discharge Note       Date:  2024  Patient Name:  Yoly Peck    :  1970  MRN: [id number]  Physician:      Anthony Pascal DO                          Insurance: [facility] Diagnosis: M53.3 (ICD-10-CM) - Sacroiliac joint pain               Rehab Codes: R25 pain , M25.60 stiffness, R53.1 weakness   Onset Date: 2023                       Next 's appt: 24 - PCP   Visit# / total visits:                   Cancels/No Shows: 0/0    Subjective:  Patient notes she has been back to work for about 1 week. She notes pain is manageable. 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 Denies any radicular pain. She notes no difficulties at home. Reports she is resting to take breaks as needed.     Pain:  [x] Yes  [] No Location: thoracic spine   Pain Rating: (0-10 scale) 1/10 -- described as a soreness   Pain altered Tx:  [] No  [] Yes  Action:  Comments:    Objective:    Exercises:  Exercise Reps/ Time Weight/ Level Completed  Today Comments          MANUAL        Hypervolt to lumbar & glutes  8 min       PA Mobs grade II-III and MFR/TPR to L glut/piriformis 10mins   2/1 sensitivity to pressure noted.          Mat       Quad stretch 2x30\" ea   Supine with a strap/alex stretch    Hamstring stretch 2x30\" ea      LTR 10x 5\"  x    Open books  10x ea   x    Bridging- wide legged  15x 3\" green     Single knee fall out   10x2  ea green x Cues for form    90/90 heel hovers  10x2 ea    Does increase pain    Seated figure 4  2x30s ea   Cues for posture           Seated on theraball        Pelvic tilts  20x   Trying to not move shoulders    Hip hikes laterally  20x ea      Around the worlds 10x ea             Standing       Standing IT band stretch 2x 30\"      3 way hip 10x2 ea green x    Squat and pull  10x 20#  x    Step ups

## 2024-02-14 ENCOUNTER — APPOINTMENT (OUTPATIENT)
Dept: PHYSICAL THERAPY | Age: 54
End: 2024-02-14
Payer: COMMERCIAL

## 2024-03-02 LAB — NONINV COLON CA DNA+OCC BLD SCRN STL QL: NEGATIVE

## 2024-03-04 ENCOUNTER — ANCILLARY ORDERS (OUTPATIENT)
Dept: PRIMARY CARE CLINIC | Age: 54
End: 2024-03-04

## 2024-03-04 ENCOUNTER — HOSPITAL ENCOUNTER (OUTPATIENT)
Dept: WOMENS IMAGING | Age: 54
Discharge: HOME | End: 2024-03-06
Attending: STUDENT IN AN ORGANIZED HEALTH CARE EDUCATION/TRAINING PROGRAM
Payer: COMMERCIAL

## 2024-03-04 VITALS — WEIGHT: 210 LBS | BODY MASS INDEX: 31.1 KG/M2 | HEIGHT: 69 IN

## 2024-03-04 DIAGNOSIS — Z12.11 COLON CANCER SCREENING: ICD-10-CM

## 2024-03-04 DIAGNOSIS — Z12.31 ENCOUNTER FOR SCREENING MAMMOGRAM FOR MALIGNANT NEOPLASM OF BREAST: ICD-10-CM

## 2024-03-04 DIAGNOSIS — D53.9 MACROCYTIC ANEMIA: ICD-10-CM

## 2024-03-04 DIAGNOSIS — R92.8 ABNORMAL SCREENING MAMMOGRAM: ICD-10-CM

## 2024-03-04 DIAGNOSIS — M53.3 SACROILIAC JOINT PAIN: Primary | ICD-10-CM

## 2024-03-04 DIAGNOSIS — N63.15 MASS OVERLAPPING MULTIPLE QUADRANTS OF RIGHT BREAST: ICD-10-CM

## 2024-03-04 DIAGNOSIS — F17.210 TOBACCO DEPENDENCE DUE TO CIGARETTES: ICD-10-CM

## 2024-03-04 DIAGNOSIS — N63.22 MASS OF UPPER INNER QUADRANT OF LEFT BREAST: ICD-10-CM

## 2024-03-04 PROCEDURE — 77063 BREAST TOMOSYNTHESIS BI: CPT

## 2024-03-25 ENCOUNTER — OFFICE VISIT (OUTPATIENT)
Dept: PRIMARY CARE CLINIC | Age: 54
End: 2024-03-25
Payer: COMMERCIAL

## 2024-03-25 VITALS
DIASTOLIC BLOOD PRESSURE: 82 MMHG | OXYGEN SATURATION: 97 % | HEART RATE: 85 BPM | SYSTOLIC BLOOD PRESSURE: 138 MMHG | BODY MASS INDEX: 31.34 KG/M2 | HEIGHT: 69 IN | WEIGHT: 211.6 LBS

## 2024-03-25 DIAGNOSIS — N63.0 BREAST MASS SEEN ON MAMMOGRAM: ICD-10-CM

## 2024-03-25 DIAGNOSIS — F17.210 TOBACCO DEPENDENCE DUE TO CIGARETTES: Primary | ICD-10-CM

## 2024-03-25 DIAGNOSIS — M53.3 SACROILIAC JOINT PAIN: ICD-10-CM

## 2024-03-25 PROCEDURE — 99214 OFFICE O/P EST MOD 30 MIN: CPT | Performed by: STUDENT IN AN ORGANIZED HEALTH CARE EDUCATION/TRAINING PROGRAM

## 2024-03-25 ASSESSMENT — ENCOUNTER SYMPTOMS
NAUSEA: 0
SHORTNESS OF BREATH: 0
ABDOMINAL PAIN: 0
VOMITING: 0

## 2024-03-25 NOTE — PROGRESS NOTES
[facility]  [facility]  [address]  [place] [facility] [address]  Dept: [phone]    Yoly Peck is a 54 y.o. female established patient, who presents today for her medical conditions/complaints as noted below:    Chief Complaint   Patient presents with    Back Pain     Pt states she's feeling good.       HPI:     Started on varenicline previously for smoking cessation. Completed physical therapy. States back pain is doing well. Tolerating varenicline at this point. Started last week.    Reviewed prior notes: previous PCP  Reviewed previous labs.    LDL Cholesterol (mg/dL)   Date Value   12/05/2023 114       (goal LDL is <100)   AST (U/L)   Date Value   12/05/2023 13     ALT (U/L)   Date Value   12/05/2023 10     BUN (mg/dL)   Date Value   12/05/2023 17     TSH (uIU/mL)   Date Value   12/05/2023 1.59     BP Readings from Last 3 Encounters:   03/25/24 138/82   02/05/24 128/74   12/04/23 126/78          (goal 120/80)    No past medical history on file.   No past surgical history on file.    No family history on file.    Social History     Tobacco Use    Smoking status: Every Day     Current packs/day: 1.00     Average packs/day: 1 pack/day for 21.2 years (21.2 ttl pk-yrs)     Types: Cigarettes     Start date: 2003    Smokeless tobacco: Never   Substance Use Topics    Alcohol use: Not Currently      Current Outpatient Medications   Medication Sig Dispense Refill    acetaminophen (TYLENOL) 325 MG tablet Take 1 tablet by mouth every 6 hours as needed for Pain      varenicline (CHANTIX) 0.5 MG tablet Take 1-2 tablets by mouth See Admin Instructions 0.5mg DAILY for 3 days followed by 0.5mg TWICE DAILY for 4 days followed by 1mg TWICE DAILY 57 tablet 0    ibuprofen (ADVIL;MOTRIN) 600 MG tablet Take 1 tablet by mouth every 6 hours as needed for Pain       No current facility-administered medications for this visit.     No Known Allergies    Health Maintenance   Topic Date Due

## 2024-04-01 ENCOUNTER — HOSPITAL ENCOUNTER (OUTPATIENT)
Age: 54
Setting detail: SPECIMEN
Discharge: HOME | End: 2024-04-01

## 2024-04-01 ENCOUNTER — OFFICE VISIT (OUTPATIENT)
Dept: PRIMARY CARE CLINIC | Age: 54
End: 2024-04-01
Payer: COMMERCIAL

## 2024-04-01 VITALS
SYSTOLIC BLOOD PRESSURE: 128 MMHG | HEIGHT: 69 IN | BODY MASS INDEX: 30.98 KG/M2 | HEART RATE: 75 BPM | OXYGEN SATURATION: 98 % | WEIGHT: 209.2 LBS | DIASTOLIC BLOOD PRESSURE: 84 MMHG

## 2024-04-01 DIAGNOSIS — Z12.4 SCREENING FOR CERVICAL CANCER: ICD-10-CM

## 2024-04-01 DIAGNOSIS — Z00.00 HEALTHCARE MAINTENANCE: Primary | ICD-10-CM

## 2024-04-01 DIAGNOSIS — Z86.73 H/O: CVA (CEREBROVASCULAR ACCIDENT): ICD-10-CM

## 2024-04-01 PROCEDURE — 99396 PREV VISIT EST AGE 40-64: CPT | Performed by: FAMILY MEDICINE

## 2024-04-01 ASSESSMENT — ENCOUNTER SYMPTOMS
DIARRHEA: 0
SHORTNESS OF BREATH: 0
SORE THROAT: 0
COUGH: 0
VOMITING: 0
EYE DISCHARGE: 0
WHEEZING: 0
EYE REDNESS: 0
NAUSEA: 0
RHINORRHEA: 0
ABDOMINAL PAIN: 0

## 2024-04-01 NOTE — PROGRESS NOTES
[facility]  [facility]  [address]  [place] [address]  Dept: [phone]        Patient: Yoly Peck  : 1970    CC:  Chief Complaint   Patient presents with    Gynecologic Exam     Pt here today for pap smear. Pt no longer having menstrual cycles       HPI: Pt is here for physical today.  No complaints or problems.    LMP - way over a year ago.  No h/o abnormal paps        PMH:   Past Medical History:   Diagnosis Date    CVA (cerebral vascular accident) (HCC)        PSH:   Past Surgical History:   Procedure Laterality Date    CHOLECYSTECTOMY, LAPAROSCOPIC      KNEE ARTHROSCOPY Right        Allergies:No Known Allergies    Social History:   Social History     Socioeconomic History    Marital status: Legally      Spouse name: None    Number of children: None    Years of education: None    Highest education level: None   Tobacco Use    Smoking status: Every Day     Current packs/day: 1.00     Average packs/day: 1 pack/day for 21.2 years (21.2 ttl pk-yrs)     Types: Cigarettes     Start date:     Smokeless tobacco: Never   Substance and Sexual Activity    Alcohol use: Not Currently    Drug use: Never     Social Determinants of Health     Financial Resource Strain: Low Risk  (2024)    Overall Financial Resource Strain (CARDIA)     Difficulty of Paying Living Expenses: Not hard at all   Food Insecurity: No Food Insecurity (2024)    Hunger Vital Sign     Worried About Running Out of Food in the Last Year: Never true     Ran Out of Food in the Last Year: Never true   Transportation Needs: Unknown (2024)    PRAPARE - Transportation     Lack of Transportation (Non-Medical): No   Housing Stability: Unknown (2024)    Housing Stability Vital Sign     Unstable Housing in the Last Year: No       Family History:   Family History   Problem Relation Age of Onset    Diabetes Sister         pre?    Diabetes Brother        ROS: Review of Systems

## 2024-04-03 LAB
HPV I/H RISK 4 DNA CVX QL NAA+PROBE: DETECTED
HPV SAMPLE: ABNORMAL
HPV, INTERPRETATION: ABNORMAL
HPV16 DNA CVX QL NAA+PROBE: NOT DETECTED
HPV18 DNA CVX QL NAA+PROBE: NOT DETECTED
SPECIMEN DESCRIPTION: ABNORMAL

## 2024-04-05 ENCOUNTER — HOSPITAL ENCOUNTER (OUTPATIENT)
Dept: WOMENS IMAGING | Age: 54
End: 2024-04-05
Payer: COMMERCIAL

## 2024-04-05 ENCOUNTER — HOSPITAL ENCOUNTER (OUTPATIENT)
Dept: WOMENS IMAGING | Age: 54
Discharge: HOME | End: 2024-04-05
Payer: COMMERCIAL

## 2024-04-05 DIAGNOSIS — R92.8 ABNORMAL MAMMOGRAM: ICD-10-CM

## 2024-04-05 DIAGNOSIS — N63.22 MASS OF UPPER INNER QUADRANT OF LEFT BREAST: ICD-10-CM

## 2024-04-05 DIAGNOSIS — N63.15 MASS OVERLAPPING MULTIPLE QUADRANTS OF RIGHT BREAST: ICD-10-CM

## 2024-04-05 DIAGNOSIS — N63.22 MASS OF UPPER INNER QUADRANT OF LEFT BREAST: Primary | ICD-10-CM

## 2024-04-05 PROCEDURE — G0279 TOMOSYNTHESIS, MAMMO: HCPCS

## 2024-04-05 PROCEDURE — 76642 ULTRASOUND BREAST LIMITED: CPT

## 2024-04-15 DIAGNOSIS — R87.613 HIGH GRADE SQUAMOUS INTRAEPITHELIAL LESION (HGSIL) ON CYTOLOGIC SMEAR OF CERVIX: Primary | ICD-10-CM

## 2024-04-15 LAB — CYTOLOGY REPORT: NORMAL

## 2024-04-23 ENCOUNTER — HOSPITAL ENCOUNTER (OUTPATIENT)
Dept: WOMENS IMAGING | Age: 54
Discharge: HOME | End: 2024-04-25
Payer: COMMERCIAL

## 2024-04-23 VITALS — DIASTOLIC BLOOD PRESSURE: 85 MMHG | SYSTOLIC BLOOD PRESSURE: 140 MMHG | HEART RATE: 81 BPM

## 2024-04-23 DIAGNOSIS — N63.22 MASS OF UPPER INNER QUADRANT OF LEFT BREAST: ICD-10-CM

## 2024-04-23 PROCEDURE — 19083 BX BREAST 1ST LESION US IMAG: CPT

## 2024-04-23 PROCEDURE — 88305 TISSUE EXAM BY PATHOLOGIST: CPT

## 2024-04-23 PROCEDURE — 77065 DX MAMMO INCL CAD UNI: CPT

## 2024-04-24 ENCOUNTER — TELEPHONE (OUTPATIENT)
Dept: WOMENS IMAGING | Age: 54
End: 2024-04-24

## 2024-04-24 NOTE — TELEPHONE ENCOUNTER
Contacted patient post biopsy of the left breast.  She is having tenderness.  I explained that it can be expected.  Advised her to take Tylenol, use intermittent ice and keep the breast supported in a bra.  She expressed understanding.  Reminded her to obtain the results of biopsy from the office of Dr BUFFY Pascal.  Offered support to her in the future should she need any. / Electronically signed by CLAUDIA Cox CBIN on 4/24/2024 at 1:20 PM

## 2024-04-25 LAB — SURGICAL PATHOLOGY REPORT: NORMAL

## 2025-04-04 ENCOUNTER — TELEPHONE (OUTPATIENT)
Dept: OBGYN CLINIC | Age: 55
End: 2025-04-04